# Patient Record
(demographics unavailable — no encounter records)

---

## 2024-12-02 NOTE — DISCUSSION/SUMMARY
[de-identified] : Assessment: The patient is a 24 year old male with physical exam findings consistent with [].   Patient and I discussed their symptoms. Discussed findings of today's exam and possible causes of patient's pain. Educated patient on their most probable diagnosis. Reviewed possible courses of treatment, and we collaboratively decided best course of treatment at this time will include:   1.MRI left pectoralis to eval lump 2. PT rx provided, but will start after reviewing MRI     Follow up after MRI   Instructions: Dx / Natural History The patient was advised of the diagnosis.  The natural history of the pathology was explained in full to the patient in layman's terms.  Several different treatment options were discussed and explained in full to the patient including the risks and benefits of both surgical and non-surgical treatments.  All questions and concerns were answered.   RICE I explained to the patient that rest, ice, compression, and elevation would benefit them.  They may return to activity after follow-up or when they no longer have any pain.   NSAIDs - OTC Patient is to begin over the counter oral anti-inflammatory medications on an as needed basis, as long as there are no medical contraindications.  Patient is counseled on possible GI and blood pressure side effects.   Pain Guide Activities The patient was advised to let pain guide the gradual advancement of activities.   Icing The patient was advised to apply ice (wrapped in a towel or protective covering) to the area daily (20 minutes at a time, 2-4X/day).   All of the patient's questions were answered to His satisfaction. Diagnoses and potential treatments were reviewed. He agreed with the plan and would like to move forward with it.

## 2024-12-02 NOTE — IMAGING
[de-identified] : Constitutional: The patient appears well developed, well nourished.  Skin: No impressive skin lesions present, except as noted in detailed exam.   Lymphatic: No palpable lymphadenopathy in examined body areas.   Neurologic: Alert and oriented to time, place and person  LEFT SHOULDER   Inspection: No swelling. Mild Scapular Protraction. Palpation: Tenderness of left pectoralis. Palpable lump over left chest  Range of motion: , ER 60, @90ER 90, @90IR 50 Strength: Forward Flexion 5/5. Abduction 5/5. External Rotation 5/5 and Internal Rotation 5/5 Neurological testing: motor and sensor intact distally. Ligament Stability and Special Tests: Shoulder apprehension: neg Shoulder relocation: neg Obriens test: neg Biceps Active test: neg Iyer Labral Shear: neg Impingement testing: neg Nilton testing: neg Cross Body Adduction: neg [Left] : left shoulder [There are no fractures, subluxations or dislocations. No significant abnormalities are seen] : There are no fractures, subluxations or dislocations. No significant abnormalities are seen

## 2024-12-02 NOTE — IMAGING
[de-identified] : Constitutional: The patient appears well developed, well nourished.  Skin: No impressive skin lesions present, except as noted in detailed exam.   Lymphatic: No palpable lymphadenopathy in examined body areas.   Neurologic: Alert and oriented to time, place and person  LEFT SHOULDER   Inspection: No swelling. Mild Scapular Protraction. Palpation: Tenderness of left pectoralis. Palpable lump over left chest  Range of motion: , ER 60, @90ER 90, @90IR 50 Strength: Forward Flexion 5/5. Abduction 5/5. External Rotation 5/5 and Internal Rotation 5/5 Neurological testing: motor and sensor intact distally. Ligament Stability and Special Tests: Shoulder apprehension: neg Shoulder relocation: neg Obriens test: neg Biceps Active test: neg Iyer Labral Shear: neg Impingement testing: neg Nilton testing: neg Cross Body Adduction: neg [Left] : left shoulder [There are no fractures, subluxations or dislocations. No significant abnormalities are seen] : There are no fractures, subluxations or dislocations. No significant abnormalities are seen

## 2024-12-02 NOTE — HISTORY OF PRESENT ILLNESS
[de-identified] : 12/02/2024: The patient is a 24 year old M, right hand dominant who presents today complaining of left pec. Date of Injury/Onset: 2023 Pain:    At Rest: 3-4/10 With Activity:  3-4/10 Mechanism of injury: gradual onset This is not a Work Related Injury being treated under Worker's Compensation. This is not an athletic injury occurring associated with an interscholastic or organized sports team. Quality of symptoms: aching pain at musculotendinous junction of left pec, denies n/t, denies weakness Improves with: N/A Worse with: flys, driving, pec exercises Prior treatment: None Prior imaging: None Previous injury: None Out of work/sport: currently working School/Sport/Position/Occupation:  - gym, golf Additional Information: has been seen by cardio to r/o heart condition - echo, CT, EKG

## 2024-12-02 NOTE — HISTORY OF PRESENT ILLNESS
[de-identified] : 12/02/2024: The patient is a 24 year old M, right hand dominant who presents today complaining of left pec. Date of Injury/Onset: 2023 Pain:    At Rest: 3-4/10 With Activity:  3-4/10 Mechanism of injury: gradual onset This is not a Work Related Injury being treated under Worker's Compensation. This is not an athletic injury occurring associated with an interscholastic or organized sports team. Quality of symptoms: aching pain at musculotendinous junction of left pec, denies n/t, denies weakness Improves with: N/A Worse with: flys, driving, pec exercises Prior treatment: None Prior imaging: None Previous injury: None Out of work/sport: currently working School/Sport/Position/Occupation:  - gym, golf Additional Information: has been seen by cardio to r/o heart condition - echo, CT, EKG

## 2025-05-08 NOTE — ASSESSMENT
Group Therapy Documentation    PATIENT'S NAME: Duyen Lindsey  MRN:   [id number]  :   2004  ACCT. NUMBER: [id number]  DATE OF SERVICE: 22  START TIME:  9:30 AM  END TIME: 10:30 AM  FACILITATOR(S): Janki Browning Haley D, TH  TOPIC: Child/Adol Group Therapy  Number of patients attending the group:  16  Group Length:  1 Hour  Interactive Complexity: Yes, visit entailed Interactive Complexity evidenced by:  See below.    Summary of Group / Topics Discussed:    ** TR/RESILIENCY GROUP **    ACTIVITY:   Group members watched the movie  The Trigemina Showman.       OBJECTIVES:   Discuss mental health benefits of watching movies, 'Cinema Therapy,  such as:     Promotes relaxation    Can increase motivation    Explore relationships in your life    Stimulation cultural and social reflection    Encourages emotional release    Provide relief when dealing with stressful situations    Healthy escapism      VALERIE Ladd      Group Attendance:  Attended group session  Interactive Complexity: Yes, visit entailed Interactive Complexity evidenced by:  -The need to manage maladaptive communication (related to, e.g., high anxiety, high reactivity, repeated questions, or disagreement) among participants that complicates delivery of care    Patient's response to the group topic/interactions:  cooperative with task    Patient appeared to be Actively participating.       Client specific details:  See above.         [FreeTextEntry1] : 25 y/o male referred to rheumatology for positive GARRET. Pt had an episode of nausea and diarrhea in 3/2025. Since then pt has been having episodes of recurrent nauseas, bloating, cramping, abdominal spams. Pt has been seen by GI with CT A/P reportedly normal. Pt has been treated with medications for symptomatic treatment. Reports noticing lower baseline heart rate. Reports chronic low back and neck stiffness and tight hamstrings. Denies joint swelling, redness, warmth, SOB, cough, Raynaud's, oral ulcers, etc. Aunt - RA  Pt has mild positive GARRET in setting of recurrent GI symptoms for ~2 months. Patient does not have signs of underlying connective tissue diseases (CTDs) including inflammatory joint pain, malar rash, photosensitivity, sicca symptoms, Raynauds. Based on existing labs, patient does not have hemolytic anemia, leukopenia, thrombocytopenia, kidney disease. Exam without synovitis, rashes, changes of Raynauds. There is low suspicion for underlying CTD.  GARRET is a marker that is sensitive but not specific for underlying rheumatologic diseases such as lupus. The most common causes of positive GARRET in patients without underlying rheumatologic diseases are infections, malignancies, and other autoimmune diseases such as Hashimotos. Up to 30% of patients without any underlying disease can have positive GARRET.  After discussion with patient and mother, given low suspicion for underlying systemic autoimmune diseases, no further workup for positive GARRET is needed. Advised to watch for symptoms concerning for systemic autoimmune diseases, especially inflammatory arthritis, photosensitive rash - patient can return for any concerns in the future.  RTC PRN

## 2025-05-08 NOTE — HISTORY OF PRESENT ILLNESS
[FreeTextEntry1] : 25 y/o male referred to rheumatology for positive GARRET. Pt had an episode of nausea and diarrhea in 3/2025. Since then pt has been having episodes of recurrent nauseas, bloating, cramping, abdominal spams. Pt has been seen by GI with CT A/P reportedly normal. Pt has been treated with medications for symptomatic treatment. Reports noticing lower baseline heart rate. Reports chronic low back and neck stiffness and tight hamstrings. Denies joint swelling, redness, warmth, SOB, cough, Raynaud's, oral ulcers, etc. Aunt - RA  WORKUP: Remarkable for (4/2025): WBC 3.6, GARRET 1:160 nuclear/speckled Normal/neg (4/2025): CMP, ESR, CRP, tTG Ab, iron panel, ferritin, B12, folate, TSH, thyroid panel